# Patient Record
(demographics unavailable — no encounter records)

---

## 2019-10-15 NOTE — MRI
RIGHT SHOULDER MRI WITHOUT IV CONTRAST:

 

Date:  10/14/19 

 

HISTORY:  

Right shoulder pain. 

 

TECHNIQUE:  

Multiplanar, multisequence MRI examination of the right shoulder is performed. 

 

FINDINGS:

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
ping of the anterior acromion, with some fat stranding in the subacromial/subdeltoid bursa. There is 
a mid-grade partial thickness undersurface tear of the supraspinatus tendon/conjoined tendon region. 
There appears to be some minimal bursal-sided fraying at the level of the magic angle of the supraspi
natus tendon. Minimal linear abnormal signal in the subscapularis tendon, evidence for some minimal d
elamination. The biceps tendon appears intact. Evidence for abnormal signal associated with the super
ior labrum. Evidence for small SLAP-type tear. No acute osteochondral defect. 

 

IMPRESSION: 

1.  Partial thickness tears of the supraspinatus and conjoined tendon and small delaminating tear of 
the subscapularis tendon. 

 

2.  Marked downsloping of the lateral acromion and moderate downsloping of the anterior acromion with
 some fluid and fat stranding in the subacromial bursa. 

 

3.  Evidence for small SLAP tear. 

 

 

POS: [facility]